# Patient Record
(demographics unavailable — no encounter records)

---

## 2025-02-06 NOTE — ASSESSMENT
[FreeTextEntry1] : IMP: 54yo F w/ adenocarcinoma of sigmoid colon  s/p open LAR on 6/7/2024 - moderately differentiated adenocarcinoma of sigmoid colon, 3.5 cm, margins negative, 0/23 LN, pT3 N0 - proximal & distal donuts benign  Now with abnormalities on PET-CT  PLAN: BW now RTO q3 months with bloodwork CT chest 6 months

## 2025-02-10 NOTE — DISCUSSION/SUMMARY
[FreeTextEntry1] : Impression 1.4 cm part solid right upper lobe nodule -No other nodules -No increased activity on PET/CT, see report Never smoked  Recommend Follow-up CT of the chest -I will see her again in a few months Advised to see GI regarding the findings at the GE junction Follow-up with her breast doctor and GYN also advised  Images reviewed.  The above was discussed with her and her .

## 2025-02-10 NOTE — HISTORY OF PRESENT ILLNESS
[Never] : never [TextBox_4] : She is a 53-year-old never smoker who recently was found to have colon cancer for which she underwent surgery in June 2024. CT of the chest demonstrated a right upper lobe nodule.  She denied any history of pulmonary disease including pneumonia or tuberculosis.  She denied any cough, wheezing or shortness of breath.  No fever, chills or night sweats.  No chest pain.  She came to discuss the PET/CT results.  She will with her .  She had a breast biopsy on the left earlier this year. [Awakes Unrefreshed] : does not awaken unrefreshed

## 2025-02-10 NOTE — PHYSICAL EXAM
[No Acute Distress] : no acute distress [Normal Appearance] : normal appearance [Normal Rate/Rhythm] : normal rate/rhythm [Normal S1, S2] : normal s1, s2 [Clear to Auscultation Bilaterally] : clear to auscultation bilaterally [Benign] : benign [No HSM] : no hsm [Normal Gait] : normal gait [No Clubbing] : no clubbing [No Cyanosis] : no cyanosis [No Edema] : no edema [Normal Turgor] : normal turgor [No Focal Deficits] : no focal deficits [No Motor Deficits] : no motor deficits [Oriented x3] : oriented x3 [Normal Affect] : normal affect

## 2025-02-10 NOTE — PROCEDURE
[FreeTextEntry1] : CT Chest 8/6/24: 1.4cm part solid nodule in the right upper lobe.  No change from the prior study of 5/24/2024.  PET/CT 9/3/24: No abnormal uptake in the right upper lobe 1.4 cm part solid nodule.  Increased uptake in the left and right breast as noted.  Findings at the GE junction also noted.  Uterine findings also noted.  See report.  PFT 8/21/24: Spirometry was normal.  Lung volumes were normal.  Diffusion was normal.  No significant change after bronchodilator.  Postbronchodilator FEV1 was 2.74 L.

## 2025-02-10 NOTE — REVIEW OF SYSTEMS
[Fever] : no fever [Fatigue] : no fatigue [Sinus Problems] : no sinus problems [Cough] : no cough [Hemoptysis] : no hemoptysis [Dyspnea] : no dyspnea [Chest Discomfort] : no chest discomfort [Palpitations] : no palpitations [GERD] : no gerd [Dysuria] : no dysuria [Myalgias] : no myalgias [Rash] : no rash [Anemia] : no anemia [Headache] : no headache [Anxiety] : no anxiety [Diabetes] : no diabetes

## 2025-02-13 NOTE — PHYSICAL EXAM
[Normal] : supple, no neck mass and thyroid not enlarged [Normal Supraclavicular Lymph Nodes] : normal supraclavicular lymph nodes [Normal Groin Lymph Nodes] : normal groin lymph nodes [Normal] : oriented to person, place and time, with appropriate affect [de-identified] : lower midline incision but no masses

## 2025-02-13 NOTE — ASSESSMENT
[FreeTextEntry1] : IMP: 52yo F w/ adenocarcinoma of sigmoid colon  s/p open LAR on 6/7/2024 - moderately differentiated adenocarcinoma of sigmoid colon, 3.5 cm, margins negative, 0/23 LN, pT3 N0 - proximal & distal donuts benign  Now with abnormalities on PET-CT EGD 10/26/24: per patient H.pylori completed Antibiotic  CT chest 1/10/25: Stable 1.4 cm part solid nodule in the right upper lobe. Labs 2/8/25: CEA 9.6( high) Ca 19-9 and Hepatic panel normal.   PLAN: RTO q3 months with bloodwork, if CEA continues to be elevated will repeat scan at this time. CT chest 6 months Colonoscopy 5/2025

## 2025-02-13 NOTE — CONSULT LETTER
[Dear  ___] : Dear  [unfilled], [Courtesy Letter:] : I had the pleasure of seeing your patient, [unfilled], in my office today. [Please see my note below.] : Please see my note below. [Consult Closing:] : Thank you very much for allowing me to participate in the care of this patient.  If you have any questions, please do not hesitate to contact me. [Sincerely,] : Sincerely, [FreeTextEntry1] : She will be seeing you for colonoscopy in May.  I will keep you informed of the CEA follow-up. [FreeTextEntry3] : Nabor Coreas MD FACS Chief of Surgical Oncology

## 2025-02-13 NOTE — HISTORY OF PRESENT ILLNESS
[de-identified] : Ms. TORRIE OVALLES is a 54-year-old woman, referred by Dr. Jitendra Amaya, here for a follow up visit.  Primarily Greek speaking  this was found on a screening colonoscopy. no hematochezia or change in bowel habits. No family hx. of colon cancer Denies any GI symptoms - appetite & weight are well maintained  PMH/PSH: hypothyroid s/p thyroidectomy ~2018 FamHX of sister w/ cervical cancer (50)  baseline screening colonoscopy 5/2024 - fungating partially obstructing large mass found in the recto sigmoid colon at about 16 cm, non-circumferential - bx done and area tattooed  * small foci of intramucosal adenocarcinoma, MMR-P w/ high-grade dysplasia   she also had a recent biopsy of the left breast which showed atypical ductal hyperplasia. she is schedule for an MRI to R/O other breast disease and will then need a left breast lumpectomy. ** had surgery on 5/29 w/ Dr. Isma George @ [facility]  CT C/A/P 5/24/2024  - heterogenous wall thickening in sigmoid colon c/w bx proven colon cancer - RUL 1.4 cm ground glass opacity, not typical for colon cancer mets, possible inflammation or infection -> f/u CT chest in 3-6 months - heterogenous uterus -> f/u w/ pelvic U/S   s/p open LAR on 6/7/2024 - moderately differentiated adenocarcinoma of sigmoid colon, 3.5 cm, margins negative, 0/23 LN, pT3 N0 - proximal & distal donuts benign   CT chest 8/1/24: Stable 1.4 cm part solid nodule in the right upper lobe when compared to previous exam. Primary differential diagnostic consideration includes lung carcinoma.  PETCT 9/3/24: Overall no abnormal FDG uptake is noted in a right upper lobe 1.4 cm patchy groundglass part solid lung nodule. Low-grade malignancy cannot be excluded. Further correlation with a follow-up chest CT in 3-6 months is recommended to assess for stability/resolution. Increased uptake is noted in areas of soft tissue stranding and nodularity along the left more than right medial breast. Correlation with dedicated breast imaging is recommended as indicated. Slightly prominent perihilar foci of uptake are noted. This is of unclear clinical significance. Differential includes granulomatous disease although malignancy cannot be completely excluded. Further correlation and monitoring as clinically indicated. Intense diffuse uptake is noted in the GE junction and gastric cardia. This may be inflammatory in nature although correlation with endoscopy may be helpful. Enlarged myomatous uterus with diffuse faint uptake is noted. Further correlation with a pelvic ultrasound may be of additional value as clinically indicated. Radiotracer uptake is noted within the perineum. This is nonspecific and urinary contamination is in the differential. Further correlation with clinical exam is recommended  CT abd/pelvis 9/17/24: fibroid in uterus   per patient b/l mammo/sono done 3/2024 follow by left breast biopsy benign   EGD 10/26/24: per patient H.pylori completed Antibiotic and is now negative CT chest 1/10/25: Stable 1.4 cm part solid nodule in the right upper lobe. Labs 2/8/25: CEA 9.6( high) Ca 19-9 and Hepatic panel normal.

## 2025-02-13 NOTE — PHYSICAL EXAM
[Normal] : supple, no neck mass and thyroid not enlarged [Normal Supraclavicular Lymph Nodes] : normal supraclavicular lymph nodes [Normal Groin Lymph Nodes] : normal groin lymph nodes [Normal] : oriented to person, place and time, with appropriate affect [de-identified] : lower midline incision but no masses

## 2025-02-13 NOTE — HISTORY OF PRESENT ILLNESS
[de-identified] : Ms. TORRIE OVALLES is a 54-year-old woman, referred by Dr. Jitendra Amaya, here for a follow up visit.  Primarily Burkinan speaking  this was found on a screening colonoscopy. no hematochezia or change in bowel habits. No family hx. of colon cancer Denies any GI symptoms - appetite & weight are well maintained  PMH/PSH: hypothyroid s/p thyroidectomy ~2018 FamHX of sister w/ cervical cancer (50)  baseline screening colonoscopy 5/2024 - fungating partially obstructing large mass found in the recto sigmoid colon at about 16 cm, non-circumferential - bx done and area tattooed  * small foci of intramucosal adenocarcinoma, MMR-P w/ high-grade dysplasia   she also had a recent biopsy of the left breast which showed atypical ductal hyperplasia. she is schedule for an MRI to R/O other breast disease and will then need a left breast lumpectomy. ** had surgery on 5/29 w/ Dr. Isma George @ [facility]  CT C/A/P 5/24/2024  - heterogenous wall thickening in sigmoid colon c/w bx proven colon cancer - RUL 1.4 cm ground glass opacity, not typical for colon cancer mets, possible inflammation or infection -> f/u CT chest in 3-6 months - heterogenous uterus -> f/u w/ pelvic U/S   s/p open LAR on 6/7/2024 - moderately differentiated adenocarcinoma of sigmoid colon, 3.5 cm, margins negative, 0/23 LN, pT3 N0 - proximal & distal donuts benign   CT chest 8/1/24: Stable 1.4 cm part solid nodule in the right upper lobe when compared to previous exam. Primary differential diagnostic consideration includes lung carcinoma.  PETCT 9/3/24: Overall no abnormal FDG uptake is noted in a right upper lobe 1.4 cm patchy groundglass part solid lung nodule. Low-grade malignancy cannot be excluded. Further correlation with a follow-up chest CT in 3-6 months is recommended to assess for stability/resolution. Increased uptake is noted in areas of soft tissue stranding and nodularity along the left more than right medial breast. Correlation with dedicated breast imaging is recommended as indicated. Slightly prominent perihilar foci of uptake are noted. This is of unclear clinical significance. Differential includes granulomatous disease although malignancy cannot be completely excluded. Further correlation and monitoring as clinically indicated. Intense diffuse uptake is noted in the GE junction and gastric cardia. This may be inflammatory in nature although correlation with endoscopy may be helpful. Enlarged myomatous uterus with diffuse faint uptake is noted. Further correlation with a pelvic ultrasound may be of additional value as clinically indicated. Radiotracer uptake is noted within the perineum. This is nonspecific and urinary contamination is in the differential. Further correlation with clinical exam is recommended  CT abd/pelvis 9/17/24: fibroid in uterus   per patient b/l mammo/sono done 3/2024 follow by left breast biopsy benign   EGD 10/26/24: per patient H.pylori completed Antibiotic and is now negative CT chest 1/10/25: Stable 1.4 cm part solid nodule in the right upper lobe. Labs 2/8/25: CEA 9.6( high) Ca 19-9 and Hepatic panel normal.

## 2025-02-13 NOTE — ASSESSMENT
[FreeTextEntry1] : IMP: 54yo F w/ adenocarcinoma of sigmoid colon  s/p open LAR on 6/7/2024 - moderately differentiated adenocarcinoma of sigmoid colon, 3.5 cm, margins negative, 0/23 LN, pT3 N0 - proximal & distal donuts benign  Now with abnormalities on PET-CT EGD 10/26/24: per patient H.pylori completed Antibiotic  CT chest 1/10/25: Stable 1.4 cm part solid nodule in the right upper lobe. Labs 2/8/25: CEA 9.6( high) Ca 19-9 and Hepatic panel normal.   PLAN: RTO q3 months with bloodwork, if CEA continues to be elevated will repeat scan at this time. CT chest 6 months Colonoscopy 5/2025

## 2025-05-14 NOTE — ASSESSMENT
[FreeTextEntry1] : IMP: 54yo F w/ adenocarcinoma of sigmoid colon  s/p open LAR on 6/7/2024 - moderately differentiated adenocarcinoma of sigmoid colon, 3.5 cm, margins negative, 0/23 LN, pT3 N0 - proximal & distal donuts benign  Now with abnormalities on PET-CT EGD 10/26/24: per patient H.pylori completed Antibiotic  CT chest 1/10/25: Stable 1.4 cm part solid nodule in the right upper lobe. Labs 2/8/25: CEA 9.6( high) Ca 19-9 and Hepatic panel normal.  Labs 5/2025: WNL (CEA now 2.3)  PLAN: RTO q3? CT chest 6 months (8/2025) Colonoscopy 5/2025?

## 2025-05-14 NOTE — HISTORY OF PRESENT ILLNESS
[de-identified] : Ms. TORRIE OVALLES is a 54-year-old woman, referred by Dr. Jitendra Amaya, here for a follow up visit.  Primarily Wallisian speaking  this was found on a screening colonoscopy. no hematochezia or change in bowel habits. No family hx. of colon cancer Denies any GI symptoms - appetite & weight are well maintained  PMH/PSH: hypothyroid s/p thyroidectomy ~2018 FamHX of sister w/ cervical cancer (50)  baseline screening colonoscopy 5/2024 - fungating partially obstructing large mass found in the recto sigmoid colon at about 16 cm, non-circumferential - bx done and area tattooed  * small foci of intramucosal adenocarcinoma, MMR-P w/ high-grade dysplasia   she also had a recent biopsy of the left breast which showed atypical ductal hyperplasia. she is schedule for an MRI to R/O other breast disease and will then need a left breast lumpectomy. ** had surgery on 5/29 w/ Dr. Isma George @ [facility]  CT C/A/P 5/24/2024  - heterogenous wall thickening in sigmoid colon c/w bx proven colon cancer - RUL 1.4 cm ground glass opacity, not typical for colon cancer mets, possible inflammation or infection -> f/u CT chest in 3-6 months - heterogenous uterus -> f/u w/ pelvic U/S   s/p open LAR on 6/7/2024 - moderately differentiated adenocarcinoma of sigmoid colon, 3.5 cm, margins negative, 0/23 LN, pT3 N0 - proximal & distal donuts benign   CT chest 8/1/24: Stable 1.4 cm part solid nodule in the right upper lobe when compared to previous exam. Primary differential diagnostic consideration includes lung carcinoma.  PETCT 9/3/24: Overall no abnormal FDG uptake is noted in a right upper lobe 1.4 cm patchy groundglass part solid lung nodule. Low-grade malignancy cannot be excluded. Further correlation with a follow-up chest CT in 3-6 months is recommended to assess for stability/resolution. Increased uptake is noted in areas of soft tissue stranding and nodularity along the left more than right medial breast. Correlation with dedicated breast imaging is recommended as indicated. Slightly prominent perihilar foci of uptake are noted. This is of unclear clinical significance. Differential includes granulomatous disease although malignancy cannot be completely excluded. Further correlation and monitoring as clinically indicated. Intense diffuse uptake is noted in the GE junction and gastric cardia. This may be inflammatory in nature although correlation with endoscopy may be helpful. Enlarged myomatous uterus with diffuse faint uptake is noted. Further correlation with a pelvic ultrasound may be of additional value as clinically indicated. Radiotracer uptake is noted within the perineum. This is nonspecific and urinary contamination is in the differential. Further correlation with clinical exam is recommended  CT abd/pelvis 9/17/24: fibroid in uterus   per patient b/l mammo/sono done 3/2024 follow by left breast biopsy benign   EGD 10/26/24: per patient H.pylori completed Antibiotic and is now negative CT chest 1/10/25: Stable 1.4 cm part solid nodule in the right upper lobe. Labs 2/8/25: CEA 9.6(high) Ca 19-9 and Hepatic panel normal.   Labs 5/13/2025: WNL (CEA now 2.3)

## 2025-05-14 NOTE — PHYSICAL EXAM
[Normal] : supple, no neck mass and thyroid not enlarged [Normal Supraclavicular Lymph Nodes] : normal supraclavicular lymph nodes [Normal Groin Lymph Nodes] : normal groin lymph nodes [Normal] : oriented to person, place and time, with appropriate affect [de-identified] : lower midline incision but no masses

## 2025-05-22 NOTE — HISTORY OF PRESENT ILLNESS
[de-identified] : Ms. TORRIE OVALLES is a 54-year-old woman, referred by Dr. Jitendra Amaya, here for a follow up visit.  Primarily Mauritanian speaking  this was found on a screening colonoscopy. no hematochezia or change in bowel habits. No family hx. of colon cancer Denies any GI symptoms - appetite & weight are well maintained  PMH/PSH: hypothyroid s/p thyroidectomy ~2018 FamHX of sister w/ cervical cancer (50)  baseline screening colonoscopy 5/2024 - fungating partially obstructing large mass found in the recto sigmoid colon at about 16 cm, non-circumferential - bx done and area tattooed  * small foci of intramucosal adenocarcinoma, MMR-P w/ high-grade dysplasia   she also had a recent biopsy of the left breast which showed atypical ductal hyperplasia. she is schedule for an MRI to R/O other breast disease and will then need a left breast lumpectomy. ** had surgery on 5/29 w/ Dr. Isma George @ [facility]B  CT C/A/P 5/24/2024  - heterogenous wall thickening in sigmoid colon c/w bx proven colon cancer - RUL 1.4 cm ground glass opacity, not typical for colon cancer mets, possible inflammation or infection -> f/u CT chest in 3-6 months - heterogenous uterus -> f/u w/ pelvic U/S   s/p open LAR on 6/7/2024 - moderately differentiated adenocarcinoma of sigmoid colon, 3.5 cm, margins negative, 0/23 LN, pT3 N0 - proximal & distal donuts benign   CT chest 8/1/24: Stable 1.4 cm part solid nodule in the right upper lobe when compared to previous exam. Primary differential diagnostic consideration includes lung carcinoma.  PETCT 9/3/24: Overall no abnormal FDG uptake is noted in a right upper lobe 1.4 cm patchy groundglass part solid lung nodule. Low-grade malignancy cannot be excluded. Further correlation with a follow-up chest CT in 3-6 months is recommended to assess for stability/resolution. Increased uptake is noted in areas of soft tissue stranding and nodularity along the left more than right medial breast. Correlation with dedicated breast imaging is recommended as indicated. Slightly prominent perihilar foci of uptake are noted. This is of unclear clinical significance. Differential includes granulomatous disease although malignancy cannot be completely excluded. Further correlation and monitoring as clinically indicated. Intense diffuse uptake is noted in the GE junction and gastric cardia. This may be inflammatory in nature although correlation with endoscopy may be helpful. Enlarged myomatous uterus with diffuse faint uptake is noted. Further correlation with a pelvic ultrasound may be of additional value as clinically indicated. Radiotracer uptake is noted within the perineum. This is nonspecific and urinary contamination is in the differential. Further correlation with clinical exam is recommended  CT abd/pelvis 9/17/24: fibroid in uterus   per patient b/l mammo/sono done 3/2024 follow by left breast biopsy benign   EGD 10/26/24: per patient H.pylori completed Antibiotic and is now negative CT chest 1/10/25: Stable 1.4 cm part solid nodule in the right upper lobe. Labs 2/8/25: CEA 9.6(high) Ca 19-9 and Hepatic panel normal.   Labs 5/13/2025: WNL (CEA now 2.3)  Colonoscopy on 5/10/2025: patent end-to-end rectal anastromosis. One 6 mm polyp at the recto-sigmoid colon, removed. Pathology shows: Inflammatory polyp. Recommended colonoscopy in 2 years.

## 2025-05-22 NOTE — ASSESSMENT
[FreeTextEntry1] : IMP: 54yo F w/ adenocarcinoma of sigmoid colon  s/p open LAR on 6/7/2024 - moderately differentiated adenocarcinoma of sigmoid colon, 3.5 cm, margins negative, 0/23 LN, pT3 N0 - proximal & distal donuts benign  Now with abnormalities on PET-CT EGD 10/26/24: per patient H.pylori completed Antibiotic  CT chest 1/10/25: Stable 1.4 cm part solid nodule in the right upper lobe. Labs 2/8/25: CEA 9.6( high) Ca 19-9 and Hepatic panel normal.  Labs 5/2025: WNL (CEA now 2.3) Colonoscop ( 4/2025): benign polyp, patent anastomosis. repeat in 2 years.  PLAN: RTO q4m w/ BW CT chest 6 months (8/2025) Colonoscopy 5/2027

## 2025-05-22 NOTE — CONSULT LETTER
[FreeTextEntry1] : She will be seeing you for colonoscopy in May.  I will keep you informed of the CEA follow-up. [FreeTextEntry3] : Nabor Coreas MD FACS Chief of Surgical Oncology

## 2025-05-22 NOTE — ASSESSMENT
[FreeTextEntry1] : IMP: 52yo F w/ adenocarcinoma of sigmoid colon  s/p open LAR on 6/7/2024 - moderately differentiated adenocarcinoma of sigmoid colon, 3.5 cm, margins negative, 0/23 LN, pT3 N0 - proximal & distal donuts benign  Now with abnormalities on PET-CT EGD 10/26/24: per patient H.pylori completed Antibiotic  CT chest 1/10/25: Stable 1.4 cm part solid nodule in the right upper lobe. Labs 2/8/25: CEA 9.6( high) Ca 19-9 and Hepatic panel normal.  Labs 5/2025: WNL (CEA now 2.3) Colonoscop ( 4/2025): benign polyp, patent anastomosis. repeat in 2 years.  PLAN: RTO q4m w/ BW CT chest 6 months (8/2025) Colonoscopy 5/2027

## 2025-05-22 NOTE — HISTORY OF PRESENT ILLNESS
[de-identified] : Ms. TORRIE OVALLES is a 54-year-old woman, referred by Dr. Jitendra Amaya, here for a follow up visit.  Primarily Northern Irish speaking  this was found on a screening colonoscopy. no hematochezia or change in bowel habits. No family hx. of colon cancer Denies any GI symptoms - appetite & weight are well maintained  PMH/PSH: hypothyroid s/p thyroidectomy ~2018 FamHX of sister w/ cervical cancer (50)  baseline screening colonoscopy 5/2024 - fungating partially obstructing large mass found in the recto sigmoid colon at about 16 cm, non-circumferential - bx done and area tattooed  * small foci of intramucosal adenocarcinoma, MMR-P w/ high-grade dysplasia   she also had a recent biopsy of the left breast which showed atypical ductal hyperplasia. she is schedule for an MRI to R/O other breast disease and will then need a left breast lumpectomy. ** had surgery on 5/29 w/ Dr. Isma George @ [facility]  CT C/A/P 5/24/2024  - heterogenous wall thickening in sigmoid colon c/w bx proven colon cancer - RUL 1.4 cm ground glass opacity, not typical for colon cancer mets, possible inflammation or infection -> f/u CT chest in 3-6 months - heterogenous uterus -> f/u w/ pelvic U/S   s/p open LAR on 6/7/2024 - moderately differentiated adenocarcinoma of sigmoid colon, 3.5 cm, margins negative, 0/23 LN, pT3 N0 - proximal & distal donuts benign   CT chest 8/1/24: Stable 1.4 cm part solid nodule in the right upper lobe when compared to previous exam. Primary differential diagnostic consideration includes lung carcinoma.  PETCT 9/3/24: Overall no abnormal FDG uptake is noted in a right upper lobe 1.4 cm patchy groundglass part solid lung nodule. Low-grade malignancy cannot be excluded. Further correlation with a follow-up chest CT in 3-6 months is recommended to assess for stability/resolution. Increased uptake is noted in areas of soft tissue stranding and nodularity along the left more than right medial breast. Correlation with dedicated breast imaging is recommended as indicated. Slightly prominent perihilar foci of uptake are noted. This is of unclear clinical significance. Differential includes granulomatous disease although malignancy cannot be completely excluded. Further correlation and monitoring as clinically indicated. Intense diffuse uptake is noted in the GE junction and gastric cardia. This may be inflammatory in nature although correlation with endoscopy may be helpful. Enlarged myomatous uterus with diffuse faint uptake is noted. Further correlation with a pelvic ultrasound may be of additional value as clinically indicated. Radiotracer uptake is noted within the perineum. This is nonspecific and urinary contamination is in the differential. Further correlation with clinical exam is recommended  CT abd/pelvis 9/17/24: fibroid in uterus   per patient b/l mammo/sono done 3/2024 follow by left breast biopsy benign   EGD 10/26/24: per patient H.pylori completed Antibiotic and is now negative CT chest 1/10/25: Stable 1.4 cm part solid nodule in the right upper lobe. Labs 2/8/25: CEA 9.6(high) Ca 19-9 and Hepatic panel normal.   Labs 5/13/2025: WNL (CEA now 2.3)  Colonoscopy on 5/10/2025: patent end-to-end rectal anastromosis. One 6 mm polyp at the recto-sigmoid colon, removed. Pathology shows: Inflammatory polyp. Recommended colonoscopy in 2 years.